# Patient Record
Sex: MALE | Race: WHITE | NOT HISPANIC OR LATINO
[De-identification: names, ages, dates, MRNs, and addresses within clinical notes are randomized per-mention and may not be internally consistent; named-entity substitution may affect disease eponyms.]

---

## 2017-08-03 ENCOUNTER — RECORDS - HEALTHEAST (OUTPATIENT)
Dept: ADMINISTRATIVE | Facility: OTHER | Age: 67
End: 2017-08-03

## 2018-02-08 ENCOUNTER — RECORDS - HEALTHEAST (OUTPATIENT)
Dept: ADMINISTRATIVE | Facility: OTHER | Age: 68
End: 2018-02-08

## 2018-02-14 ENCOUNTER — RECORDS - HEALTHEAST (OUTPATIENT)
Dept: ADMINISTRATIVE | Facility: OTHER | Age: 68
End: 2018-02-14

## 2018-02-21 ENCOUNTER — COMMUNICATION - HEALTHEAST (OUTPATIENT)
Dept: PULMONOLOGY | Facility: OTHER | Age: 68
End: 2018-02-21

## 2018-02-21 ENCOUNTER — AMBULATORY - HEALTHEAST (OUTPATIENT)
Dept: PULMONOLOGY | Facility: OTHER | Age: 68
End: 2018-02-21

## 2018-03-08 ENCOUNTER — OFFICE VISIT - HEALTHEAST (OUTPATIENT)
Dept: PULMONOLOGY | Facility: OTHER | Age: 68
End: 2018-03-08

## 2018-03-08 DIAGNOSIS — J84.116 CRYPTOGENIC ORGANIZING PNEUMONIA (H): ICD-10-CM

## 2018-03-09 ENCOUNTER — COMMUNICATION - HEALTHEAST (OUTPATIENT)
Dept: PULMONOLOGY | Facility: OTHER | Age: 68
End: 2018-03-09

## 2018-03-13 ENCOUNTER — COMMUNICATION - HEALTHEAST (OUTPATIENT)
Dept: PULMONOLOGY | Facility: OTHER | Age: 68
End: 2018-03-13

## 2018-03-13 DIAGNOSIS — J84.116 CRYPTOGENIC ORGANIZING PNEUMONIA (H): ICD-10-CM

## 2018-03-14 ENCOUNTER — AMBULATORY - HEALTHEAST (OUTPATIENT)
Dept: PULMONOLOGY | Facility: OTHER | Age: 68
End: 2018-03-14

## 2018-03-16 ENCOUNTER — AMBULATORY - HEALTHEAST (OUTPATIENT)
Dept: PULMONOLOGY | Facility: OTHER | Age: 68
End: 2018-03-16

## 2018-03-16 DIAGNOSIS — B38.2 PULMONARY COCCIDIOIDOMYCOSIS (H): ICD-10-CM

## 2018-03-19 ENCOUNTER — AMBULATORY - HEALTHEAST (OUTPATIENT)
Dept: PULMONOLOGY | Facility: OTHER | Age: 68
End: 2018-03-19

## 2018-03-19 DIAGNOSIS — B38.2 PULMONARY COCCIDIOIDOMYCOSIS (H): ICD-10-CM

## 2018-03-19 DIAGNOSIS — J84.116 CRYPTOGENIC ORGANIZING PNEUMONIA (H): ICD-10-CM

## 2018-03-22 ENCOUNTER — COMMUNICATION - HEALTHEAST (OUTPATIENT)
Dept: PULMONOLOGY | Facility: OTHER | Age: 68
End: 2018-03-22

## 2018-03-27 ENCOUNTER — AMBULATORY - HEALTHEAST (OUTPATIENT)
Dept: PULMONOLOGY | Facility: OTHER | Age: 68
End: 2018-03-27

## 2018-03-27 DIAGNOSIS — J18.9 PNEUMONIA: ICD-10-CM

## 2018-03-29 ENCOUNTER — HOSPITAL ENCOUNTER (OUTPATIENT)
Dept: CT IMAGING | Facility: CLINIC | Age: 68
Discharge: HOME | End: 2018-03-29
Attending: INTERNAL MEDICINE

## 2018-03-29 DIAGNOSIS — J84.116 CRYPTOGENIC ORGANIZING PNEUMONIA (H): ICD-10-CM

## 2018-04-09 ENCOUNTER — OFFICE VISIT - HEALTHEAST (OUTPATIENT)
Dept: PULMONOLOGY | Facility: OTHER | Age: 68
End: 2018-04-09

## 2018-04-09 DIAGNOSIS — B38.2 COCCIDIOIDAL PNEUMONITIS (H): ICD-10-CM

## 2018-04-09 LAB
ALBUMIN SERPL-MCNC: 3.7 G/DL (ref 3.5–5)
ALP SERPL-CCNC: 55 U/L (ref 45–120)
ALT SERPL W P-5'-P-CCNC: 34 U/L (ref 0–45)
ANION GAP SERPL CALCULATED.3IONS-SCNC: 10 MMOL/L (ref 5–18)
AST SERPL W P-5'-P-CCNC: 28 U/L (ref 0–40)
BILIRUB SERPL-MCNC: 0.4 MG/DL (ref 0–1)
BUN SERPL-MCNC: 19 MG/DL (ref 8–22)
CALCIUM SERPL-MCNC: 9.2 MG/DL (ref 8.5–10.5)
CHLORIDE BLD-SCNC: 106 MMOL/L (ref 98–107)
CO2 SERPL-SCNC: 24 MMOL/L (ref 22–31)
CREAT SERPL-MCNC: 0.81 MG/DL (ref 0.7–1.3)
GFR SERPL CREATININE-BSD FRML MDRD: >60 ML/MIN/1.73M2
GLUCOSE BLD-MCNC: 88 MG/DL (ref 70–125)
POTASSIUM BLD-SCNC: 4.1 MMOL/L (ref 3.5–5)
PROT SERPL-MCNC: 7.6 G/DL (ref 6–8)
SODIUM SERPL-SCNC: 140 MMOL/L (ref 136–145)

## 2018-04-10 ENCOUNTER — HOSPITAL ENCOUNTER (OUTPATIENT)
Dept: RESPIRATORY THERAPY | Facility: CLINIC | Age: 68
Discharge: HOME | End: 2018-04-10
Attending: INTERNAL MEDICINE

## 2018-04-10 DIAGNOSIS — B38.2 COCCIDIOIDAL PNEUMONITIS (H): ICD-10-CM

## 2018-04-10 LAB — HGB BLD-MCNC: 15.4 G/DL (ref 14–18)

## 2018-04-23 ENCOUNTER — COMMUNICATION - HEALTHEAST (OUTPATIENT)
Dept: PULMONOLOGY | Facility: OTHER | Age: 68
End: 2018-04-23

## 2018-04-23 DIAGNOSIS — J18.9 PNEUMONIA: ICD-10-CM

## 2018-05-24 ENCOUNTER — RECORDS - HEALTHEAST (OUTPATIENT)
Dept: ADMINISTRATIVE | Facility: OTHER | Age: 68
End: 2018-05-24

## 2018-05-24 ENCOUNTER — OFFICE VISIT - HEALTHEAST (OUTPATIENT)
Dept: PULMONOLOGY | Facility: OTHER | Age: 68
End: 2018-05-24

## 2018-05-24 DIAGNOSIS — R06.02 SHORTNESS OF BREATH: ICD-10-CM

## 2018-05-24 DIAGNOSIS — H93.13 TINNITUS OF BOTH EARS: ICD-10-CM

## 2018-05-24 DIAGNOSIS — J18.9 PNEUMONIA OF BOTH LUNGS DUE TO INFECTIOUS ORGANISM, UNSPECIFIED PART OF LUNG: ICD-10-CM

## 2018-05-24 DIAGNOSIS — B38.2 COCCIDIOIDAL PNEUMONITIS (H): ICD-10-CM

## 2018-05-25 LAB
ALBUMIN SERPL-MCNC: 3.9 G/DL (ref 3.5–5)
ALP SERPL-CCNC: 53 U/L (ref 45–120)
ALT SERPL W P-5'-P-CCNC: 32 U/L (ref 0–45)
ANION GAP SERPL CALCULATED.3IONS-SCNC: 10 MMOL/L (ref 5–18)
AST SERPL W P-5'-P-CCNC: 28 U/L (ref 0–40)
BILIRUB SERPL-MCNC: 0.5 MG/DL (ref 0–1)
BUN SERPL-MCNC: 19 MG/DL (ref 8–22)
CALCIUM SERPL-MCNC: 9.9 MG/DL (ref 8.5–10.5)
CHLORIDE BLD-SCNC: 107 MMOL/L (ref 98–107)
CO2 SERPL-SCNC: 24 MMOL/L (ref 22–31)
CREAT SERPL-MCNC: 0.89 MG/DL (ref 0.7–1.3)
GFR SERPL CREATININE-BSD FRML MDRD: >60 ML/MIN/1.73M2
GLUCOSE BLD-MCNC: 112 MG/DL (ref 70–125)
POTASSIUM BLD-SCNC: 4.3 MMOL/L (ref 3.5–5)
PROT SERPL-MCNC: 7.3 G/DL (ref 6–8)
SODIUM SERPL-SCNC: 141 MMOL/L (ref 136–145)

## 2018-06-01 LAB — COCCIDIOIDES AB TITR SER CF: NORMAL {TITER}

## 2018-07-06 ENCOUNTER — RECORDS - HEALTHEAST (OUTPATIENT)
Dept: LAB | Facility: CLINIC | Age: 68
End: 2018-07-06

## 2018-07-06 LAB
ALBUMIN SERPL-MCNC: 4 G/DL (ref 3.5–5)
ALP SERPL-CCNC: 54 U/L (ref 45–120)
ALT SERPL W P-5'-P-CCNC: 34 U/L (ref 0–45)
ANION GAP SERPL CALCULATED.3IONS-SCNC: 7 MMOL/L (ref 5–18)
AST SERPL W P-5'-P-CCNC: 25 U/L (ref 0–40)
BILIRUB SERPL-MCNC: 0.4 MG/DL (ref 0–1)
BUN SERPL-MCNC: 21 MG/DL (ref 8–22)
CALCIUM SERPL-MCNC: 9.8 MG/DL (ref 8.5–10.5)
CHLORIDE BLD-SCNC: 106 MMOL/L (ref 98–107)
CHOLEST SERPL-MCNC: 245 MG/DL
CO2 SERPL-SCNC: 28 MMOL/L (ref 22–31)
CREAT SERPL-MCNC: 0.88 MG/DL (ref 0.7–1.3)
FASTING STATUS PATIENT QL REPORTED: NO
GFR SERPL CREATININE-BSD FRML MDRD: >60 ML/MIN/1.73M2
GLUCOSE BLD-MCNC: 106 MG/DL (ref 70–125)
HDLC SERPL-MCNC: 36 MG/DL
LDLC SERPL CALC-MCNC: 152 MG/DL
LDLC SERPL CALC-MCNC: ABNORMAL MG/DL
POTASSIUM BLD-SCNC: 4.7 MMOL/L (ref 3.5–5)
PROT SERPL-MCNC: 7.9 G/DL (ref 6–8)
SODIUM SERPL-SCNC: 141 MMOL/L (ref 136–145)
TRIGL SERPL-MCNC: 587 MG/DL

## 2018-07-12 ENCOUNTER — OFFICE VISIT - HEALTHEAST (OUTPATIENT)
Dept: PULMONOLOGY | Facility: OTHER | Age: 68
End: 2018-07-12

## 2018-07-12 DIAGNOSIS — J16.8 FUNGAL PNEUMONIA: ICD-10-CM

## 2018-07-12 DIAGNOSIS — A07.3 COCCIDIOSIS: ICD-10-CM

## 2018-07-12 DIAGNOSIS — B49 FUNGAL PNEUMONIA: ICD-10-CM

## 2018-11-09 ENCOUNTER — OFFICE VISIT - HEALTHEAST (OUTPATIENT)
Dept: PULMONOLOGY | Facility: OTHER | Age: 68
End: 2018-11-09

## 2018-11-09 DIAGNOSIS — J31.0 CHRONIC RHINITIS: ICD-10-CM

## 2018-11-09 ASSESSMENT — MIFFLIN-ST. JEOR: SCORE: 1638.96

## 2018-12-19 ENCOUNTER — COMMUNICATION - HEALTHEAST (OUTPATIENT)
Dept: PULMONOLOGY | Facility: OTHER | Age: 68
End: 2018-12-19

## 2019-01-02 ENCOUNTER — RECORDS - HEALTHEAST (OUTPATIENT)
Dept: LAB | Facility: CLINIC | Age: 69
End: 2019-01-02

## 2019-01-02 LAB — D DIMER PPP FEU-MCNC: 1.15 FEU UG/ML

## 2019-01-03 ENCOUNTER — AMBULATORY - HEALTHEAST (OUTPATIENT)
Dept: PULMONOLOGY | Facility: OTHER | Age: 69
End: 2019-01-03

## 2019-01-03 ENCOUNTER — COMMUNICATION - HEALTHEAST (OUTPATIENT)
Dept: PULMONOLOGY | Facility: OTHER | Age: 69
End: 2019-01-03

## 2019-01-03 DIAGNOSIS — J18.9 PNEUMONIA OF RIGHT UPPER LOBE DUE TO INFECTIOUS ORGANISM: ICD-10-CM

## 2019-01-04 ENCOUNTER — AMBULATORY - HEALTHEAST (OUTPATIENT)
Dept: LAB | Facility: CLINIC | Age: 69
End: 2019-01-04

## 2019-01-04 DIAGNOSIS — J18.9 PNEUMONIA OF RIGHT UPPER LOBE DUE TO INFECTIOUS ORGANISM: ICD-10-CM

## 2019-01-04 LAB — C REACTIVE PROTEIN LHE: 3.4 MG/DL (ref 0–0.8)

## 2019-01-05 LAB
H CAPSUL AG UR QL IA: NOT DETECTED
H CAPSUL AG UR-MCNC: NOT DETECTED NG/ML

## 2019-01-06 LAB — COCCIDIOIDES AB TITR SER CF: NORMAL {TITER}

## 2019-01-08 LAB
H CAPSUL AB SER QL ID: NORMAL
H CAPSUL MYC AB TITR SER CF: NORMAL {TITER}
H CAPSUL YST AB TITR SER CF: NORMAL {TITER}

## 2019-01-09 ENCOUNTER — OFFICE VISIT - HEALTHEAST (OUTPATIENT)
Dept: PULMONOLOGY | Facility: OTHER | Age: 69
End: 2019-01-09

## 2019-01-09 DIAGNOSIS — J18.9 PNEUMONIA OF LEFT LUNG DUE TO INFECTIOUS ORGANISM, UNSPECIFIED PART OF LUNG: ICD-10-CM

## 2019-01-09 DIAGNOSIS — J43.2 CENTRILOBULAR EMPHYSEMA (H): ICD-10-CM

## 2019-01-09 DIAGNOSIS — J84.116 CRYPTOGENIC ORGANIZING PNEUMONIA (H): ICD-10-CM

## 2019-03-08 ENCOUNTER — HOSPITAL ENCOUNTER (OUTPATIENT)
Dept: CT IMAGING | Facility: CLINIC | Age: 69
Discharge: HOME | End: 2019-03-08
Attending: INTERNAL MEDICINE

## 2019-03-08 DIAGNOSIS — J18.9 PNEUMONIA OF LEFT LUNG DUE TO INFECTIOUS ORGANISM, UNSPECIFIED PART OF LUNG: ICD-10-CM

## 2019-03-19 ENCOUNTER — OFFICE VISIT - HEALTHEAST (OUTPATIENT)
Dept: PULMONOLOGY | Facility: OTHER | Age: 69
End: 2019-03-19

## 2019-03-19 DIAGNOSIS — B38.2: ICD-10-CM

## 2019-03-19 DIAGNOSIS — J47.9 BRONCHIECTASIS WITHOUT COMPLICATION (H): ICD-10-CM

## 2019-07-30 ENCOUNTER — RECORDS - HEALTHEAST (OUTPATIENT)
Dept: LAB | Facility: CLINIC | Age: 69
End: 2019-07-30

## 2019-07-30 LAB
ALBUMIN SERPL-MCNC: 4.1 G/DL (ref 3.5–5)
ALP SERPL-CCNC: 73 U/L (ref 45–120)
ALT SERPL W P-5'-P-CCNC: 31 U/L (ref 0–45)
ANION GAP SERPL CALCULATED.3IONS-SCNC: 10 MMOL/L (ref 5–18)
AST SERPL W P-5'-P-CCNC: 26 U/L (ref 0–40)
BILIRUB SERPL-MCNC: 0.7 MG/DL (ref 0–1)
BUN SERPL-MCNC: 20 MG/DL (ref 8–22)
CALCIUM SERPL-MCNC: 10.1 MG/DL (ref 8.5–10.5)
CHLORIDE BLD-SCNC: 103 MMOL/L (ref 98–107)
CHOLEST SERPL-MCNC: 218 MG/DL
CO2 SERPL-SCNC: 25 MMOL/L (ref 22–31)
CREAT SERPL-MCNC: 0.77 MG/DL (ref 0.7–1.3)
FASTING STATUS PATIENT QL REPORTED: YES
GFR SERPL CREATININE-BSD FRML MDRD: >60 ML/MIN/1.73M2
GLUCOSE BLD-MCNC: 104 MG/DL (ref 70–125)
HDLC SERPL-MCNC: 53 MG/DL
LDLC SERPL CALC-MCNC: 143 MG/DL
POTASSIUM BLD-SCNC: 4.5 MMOL/L (ref 3.5–5)
PROT SERPL-MCNC: 7.8 G/DL (ref 6–8)
SODIUM SERPL-SCNC: 138 MMOL/L (ref 136–145)
TRIGL SERPL-MCNC: 108 MG/DL

## 2020-07-13 ENCOUNTER — RECORDS - HEALTHEAST (OUTPATIENT)
Dept: LAB | Facility: CLINIC | Age: 70
End: 2020-07-13

## 2020-07-13 LAB
ALBUMIN SERPL-MCNC: 3.9 G/DL (ref 3.5–5)
ALP SERPL-CCNC: 52 U/L (ref 45–120)
ALT SERPL W P-5'-P-CCNC: 31 U/L (ref 0–45)
ANION GAP SERPL CALCULATED.3IONS-SCNC: 7 MMOL/L (ref 5–18)
AST SERPL W P-5'-P-CCNC: 24 U/L (ref 0–40)
BILIRUB SERPL-MCNC: 0.3 MG/DL (ref 0–1)
BUN SERPL-MCNC: 26 MG/DL (ref 8–28)
CALCIUM SERPL-MCNC: 9.1 MG/DL (ref 8.5–10.5)
CHLORIDE BLD-SCNC: 105 MMOL/L (ref 98–107)
CHOLEST SERPL-MCNC: 208 MG/DL
CO2 SERPL-SCNC: 27 MMOL/L (ref 22–31)
CREAT SERPL-MCNC: 0.79 MG/DL (ref 0.7–1.3)
FASTING STATUS PATIENT QL REPORTED: NO
GFR SERPL CREATININE-BSD FRML MDRD: >60 ML/MIN/1.73M2
GLUCOSE BLD-MCNC: 104 MG/DL (ref 70–125)
HDLC SERPL-MCNC: 43 MG/DL
LDLC SERPL CALC-MCNC: 130 MG/DL
POTASSIUM BLD-SCNC: 4.7 MMOL/L (ref 3.5–5)
PROT SERPL-MCNC: 7 G/DL (ref 6–8)
SODIUM SERPL-SCNC: 139 MMOL/L (ref 136–145)
TRIGL SERPL-MCNC: 174 MG/DL

## 2020-08-06 ENCOUNTER — COMMUNICATION - HEALTHEAST (OUTPATIENT)
Dept: SCHEDULING | Facility: CLINIC | Age: 70
End: 2020-08-06

## 2021-05-20 ENCOUNTER — TRANSFERRED RECORDS (OUTPATIENT)
Dept: HEALTH INFORMATION MANAGEMENT | Facility: CLINIC | Age: 71
End: 2021-05-20

## 2021-06-01 VITALS — BODY MASS INDEX: 26.78 KG/M2 | WEIGHT: 192 LBS

## 2021-06-01 VITALS — WEIGHT: 193 LBS | BODY MASS INDEX: 26.92 KG/M2

## 2021-06-01 VITALS — WEIGHT: 197.5 LBS | BODY MASS INDEX: 27.55 KG/M2

## 2021-06-01 VITALS — BODY MASS INDEX: 27.48 KG/M2 | WEIGHT: 197 LBS

## 2021-06-02 VITALS — HEIGHT: 71 IN | BODY MASS INDEX: 26.6 KG/M2 | WEIGHT: 190 LBS

## 2021-06-02 VITALS — WEIGHT: 196.2 LBS | BODY MASS INDEX: 27.36 KG/M2

## 2021-06-02 VITALS — BODY MASS INDEX: 27.84 KG/M2 | WEIGHT: 199.6 LBS

## 2021-06-16 PROBLEM — J18.9 ATYPICAL PNEUMONIA: Status: ACTIVE | Noted: 2018-02-19

## 2021-06-16 PROBLEM — B38.2: Status: ACTIVE | Noted: 2018-03-16

## 2021-06-16 PROBLEM — J18.9 PNEUMONIA: Status: ACTIVE | Noted: 2018-02-19

## 2021-06-16 NOTE — PROGRESS NOTES
Pulmonary Follow Up Note  3/8/2018      Reason for Follow Up: Abnormal CT, organizing pneumonia      Problem List:     Patient Active Problem List   Diagnosis     BPH (benign prostatic hyperplasia)     Urosepsis     Bacteremia     Diarrhea     UTI (lower urinary tract infection)     Hypertension     Peripheral neuropathy     Tachycardia     Sepsis     Fluid collection at surgical site     Fever, unspecified fever cause     Pneumonia     Atypical pneumonia     Acute respiratory failure with hypoxia     Weight loss     Chronic fever     Weakness generalized           History:   Tao Workman is a 67 y.o. old male with past medical history significant for calcified hilar lymph nodes, hypertension, obstructive sleep apnea on CPAP, and previous tobacco use who was seen in [facility]  to the hospital 2/20/18 with complaints of weakness and fevers ×1 month.  He reported that approximately 1 month prior he was having fevers up to 101 Fahrenheit.  He subsequently seen by his primary care provider who diagnosed him with bilateral pneumonia.  He was started on doxycycline.  He was planning to go to [place] at that time and so he did not have significant improvement within 3 days and so he was subsequently switched to Levaquin.  After 2 doses of Levaquin patient developed a rash with hives and was taken off of this.  He subsequen did not improve and was again placed on azithromycin without any improvement.  Throughout this fever did improve to upper 90s to low 100s but did not completely resolve.  He subsequently then was treated with a course of Augmentin.  He did not have any diarrhea or abdominal pain.  He tolerated the most the medications with the exception of the Levaquin.  Outside of the rash and hives he developed on the left when he denies any rash around his eyes chest or elsewhere.  He has not had no change in his vision.  He denies any chest pains or significant sputum production.  He does not notice any  significant timing of the day that she develops this fever.  He subsequently did have a CT angiogram that showed diffuse nodular changes throughout both lungs with the previously noted calcified lymph nodes.  He subsequently was referred for pulmonary consultation due to the CT results.  Of note his CRP was also significantly elevated at 8.1.  He did have a mild leukocytosis but his PMNs were less than 70%.  Does have a previous history of smoking but quit approximately 14 years ago.  No family history of autoimmune diseases.  He subsequently underwent bronchoscopy with TBBx and BAL.    He is seen in follow up today.  His pathology came back as organizing pneumonia.  I had discussed using prednisone with him, but he was hesitant to do due thte relative immune suppression and previous issues with staph skin infections.  He now also has a Cocci titer of 1:8.  He has not had any travel to endemic areas including the [facility].  He thinks the night sweats have mostly resolved.  He feels his breathing is doing good.  He does note some intermittent chills, as well as cough.  No rash, arthralgias, problems urinating, or ulcers.    Past Medical History:   Diagnosis Date     BPH with elevated PSA      GERD (gastroesophageal reflux disease)      Hypertension      Lymphadenopathy     seen on MRI     Peripheral neuropathy      Urinary frequency      Past Surgical History:   Procedure Laterality Date     MANDIBLE SURGERY      orthodontic procedure     [place] LAP,PROSTATECTOMY,RADICAL,W/NERVE SPARE,INCL ROBOTIC Bilateral 5/20/2015    Procedure: DAVINCI ROBOTIC ASSISTED SIMPLE PROSTATECTOMY WITH BILATERAL PELVIC LYMPH NODE DISSECTION;  Surgeon: Rocky Magaña MD;  Location: [facility];  Service: Urology     PROSTATE BIOPSY      multiple     Social History     Social History     Marital status: Single     Spouse name: N/A     Number of children: N/A     Years of education: N/A     Occupational History     Not on file.      Social History Main Topics     Smoking status: Former Smoker     Years: 35.00     Quit date: 5/1/2004     Smokeless tobacco: Never Used     Alcohol use 5.4 oz/week     9 Glasses of wine per week      Comment: weekly     Drug use: No     Sexual activity: Not on file     Other Topics Concern     Not on file     Social History Narrative     No family history on file.  Allergies   Allergen Reactions     Levaquin [Levofloxacin] Hives, Itching and Rash       Review of Systems - 10 point review of systems negative except what is mentioned in the HPI.        Medications:     Current Outpatient Prescriptions on File Prior to Visit   Medication Sig Dispense Refill     acetaminophen (TYLENOL) 500 MG tablet Take 500-1,000 mg by mouth every 6 (six) hours as needed for pain.       bismuth subsalicylate (BISMUTH SUBSALICYLATE) 262 mg Chew chew tab Chew 1-2 tablets 4 (four) times a day as needed.       doxylamine (UNISON) 25 mg tablet Take 12.5 mg by mouth at bedtime.       gabapentin (NEURONTIN) 300 MG capsule Take 600 mg by mouth 2 (two) times a day.        lisinopril (PRINIVIL,ZESTRIL) 5 MG tablet Take 5 mg by mouth daily.        multivitamin therapeutic (THERAGRAN) tablet Take 1 tablet by mouth daily.       simethicone (MYLICON) 80 MG chewable tablet Chew 80 mg every 6 (six) hours as needed.        traMADol (ULTRAM) 50 mg tablet Take 50 mg by mouth 2 (two) times a day as needed for pain.       UBIDECARENONE (COENZYME Q10) 100 mg Tab tablet Take 200 mg by mouth 2 (two) times a day.       [DISCONTINUED] guaiFENesin ER (MUCINEX) 600 mg 12 hr tablet Take 600 mg by mouth 2 (two) times a day as needed for congestion.       No current facility-administered medications on file prior to visit.            Exam/Data:   Vitals  Vitals:    03/08/18 1323   BP: 126/70   Pulse: 96   Resp: 16   SpO2: 94%       EXAM:  GEN: Alert and oriented x3, NAD  HEENT: Nares patent, posterior oropharynx clear.  RESPIRATORY: CTAB.  No wheeze or  rales  CARDIOVASCULAR: RRR, no m/r/g  GASTROINTESTINAL: Round, soft, NT/ND  HEM/ONC: no adenopathy, no ecchymosis  MSK: no clubbing.  Ambulates normally  NEURO: cranial nerves appear grossly intact, muscle strength equal  SKIN: no rash or ulcerations      DATA  No new data.    Organizing pneumonia on surgical pathology  Cocci titer 1:8 on complement fixation    IRINEO was negative, cytology negative.     Fungal cultures pending.    Assessment/Plan:   Tao Workman is a 67 y.o. male with abnormal CT and fevers, subsequently found to have organizing pneumonia on pathology, possibly cryptogenic vs fungal infection induced.    1. Organizing Pneumonia/ILD:  I have discussed with him using prednisone +/- itraconazole.  I am not sure the coccidiodes is real though as he has not been in endemic areas.  He is very hesitant to do any prednisone due to the side effects (i.e. Insomnia, cataracts, immune suppression).  He is improving clinically and ultimately would prefer to repeat the CT and see if the changes are resolved.  I think this is reasonable and will repeat the CT Chest in 3 weeks.  I did discuss with him that  can be recurrent as part of its natural progression.  He is okay with this.  I would consider azithromycin but he has some mild tinnitus as it is.  I would also consider cellcept or azathioprine but he will review these side effect profiles and we will see him back in 3 weeks.      Recommend:  - CT Chest  - consider further treatment at follow up  - RTC in 3 weeks    I spent more than 25 minutes in consultation reviewing natural course of disease, potential therapies, and future plans.      Yaakov Farley, DO

## 2021-06-16 NOTE — PROGRESS NOTES
Prior Authorization Request  Who s requesting:  Pharmacy  Pharmacy Name and Location: [facility] [address] [place] [address]  Medication Name: itraconazole 100 mg Cap  Insurance Plan: [employer]  Insurance Member ID Number:  [id number]

## 2021-06-16 NOTE — PROGRESS NOTES
Tao called to say his ringing in his ears is better but not gone completely.  Will start Fluconazole 400 mg a day per Dr. Houston, instructed to call with any questions or concerns.  Chest Ct on March 29 th. And will follow up with Dr. Farley after the Ct.

## 2021-06-16 NOTE — PROGRESS NOTES
Tao called.  Dr. Farley interested in results of his CT chest low dose for lung cancer screening that he had done last summer.    Tao states scan was done at [facility] in [facility]) and scan date was August 3, 2017.  Rosita will call for results.

## 2021-06-16 NOTE — PROGRESS NOTES
Spoke with Chacha from  insurance.  Working on appeal for itraconazole.  New diagnosis sent.  Chacha said she will submit the appeal and we should hear something back first part of next week.

## 2021-06-17 NOTE — PROGRESS NOTES
RESPIRATORY CARE NOTE     Patient Name: Tao Workman  Today's Date: 4/10/2018     Complete PFT done. Pt performed tests with good effort. Test results meet ATS criteria. Results scanned into epic. Pt left in no distress.       Caroline Jean RRT

## 2021-06-17 NOTE — PROGRESS NOTES
Pulmonary Follow Up Note  4/9/2018      Reason for Follow Up: Abnormal CT, organizing pneumonia, now with Coccidioides antibody positive at 1:8      Problem List:     Patient Active Problem List   Diagnosis     BPH (benign prostatic hyperplasia)     Urosepsis     Bacteremia     Diarrhea     UTI (lower urinary tract infection)     Hypertension     Peripheral neuropathy     Tachycardia     Sepsis     Fluid collection at surgical site     Fever, unspecified fever cause     Pneumonia     Atypical pneumonia     Acute respiratory failure with hypoxia     Weight loss     Chronic fever     Weakness generalized     Pulmonary coccidioidomycosis           History:   Tao Workman is a 67 y.o. old male with past medical history significant for calcified hilar lymph nodes, hypertension, obstructive sleep apnea on CPAP, and previous tobacco use who was seen in [facility]  to the hospital 2/20/18 with complaints of weakness and fevers ×1 month.  He reported that approximately 1 month prior he was having fevers up to 101 Fahrenheit.  He subsequently seen by his primary care provider who diagnosed him with bilateral pneumonia.  He was started on doxycycline.  He was planning to go to [place] at that time and so he did not have significant improvement within 3 days and so he was subsequently switched to Levaquin.  After 2 doses of Levaquin patient developed a rash with hives and was taken off of this.  He subsequen did not improve and was again placed on azithromycin without any improvement.  Throughout this fever did improve to upper 90s to low 100s but did not completely resolve.  He subsequently then was treated with a course of Augmentin.  He did not have any diarrhea or abdominal pain.  He tolerated the most the medications with the exception of the Levaquin.  Outside of the rash and hives he developed on the left when he denies any rash around his eyes chest or elsewhere.  He has not had no change in his vision.  He denies  any chest pains or significant sputum production.  He does not notice any significant timing of the day that she develops this fever.  He subsequently did have a CT angiogram that showed diffuse nodular changes throughout both lungs with the previously noted calcified lymph nodes.  He subsequently was referred for pulmonary consultation due to the CT results.  Of note his CRP was also significantly elevated at 8.1.  He did have a mild leukocytosis but his PMNs were less than 70%.  Does have a previous history of smoking but quit approximately 14 years ago.  No family history of autoimmune diseases.  He subsequently underwent bronchoscopy with TBBx and BAL.  This is subsequently show organizing pneumonia on his pathology and he does have a positive antibody to Coccidioides.    He is seen in follow up today.  As noted above he did have a positive cocci antibody and so he was started on itraconazole.  He subsequently developed tinnitus within 2 days and this medication was stopped.  We did subsequent switch him over to fluconazole 400 mg daily and he is tolerating this medication.  He states that his tinnitus is very mild the near its baseline.  He still feels fatigued and he has dyspnea with extreme exertion or when he is staying up late.  For the most part though his dyspnea has resolved as well.  He has not had any fevers or chills.  He denies any night sweats.  He has had some diarrhea as well but he states that this is present prior to starting any fluconazole.  Overall he puts his timeframe of therapy at approximately 1 week as he did 2 days of itraconazole and has done approximately 1 week of fluconazole.  He did undergo repeat CT scan which shows significant improvement in his opacities.  He does have some opacity still noted mostly in areas where he had more consolidation.    Of note patient does identify that he has a cactus in his home.  He has had this This though for approximately 8 years.  At one point it  was over watered and subsequently reimplanted one limb of the cactus.  He has thus replanted the cactus and changed its oil.  The previously would disrupt the soil to assess and its dryness prior to watering.  He did buy this cactus at a local nursery is unsure where this soil came from.  The repot things will was commercially available cactus soil.    Past Medical History:   Diagnosis Date     BPH with elevated PSA      GERD (gastroesophageal reflux disease)      Hypertension      Lymphadenopathy     seen on MRI     Peripheral neuropathy      Urinary frequency      Past Surgical History:   Procedure Laterality Date     MANDIBLE SURGERY      orthodontic procedure     [place] LAP,PROSTATECTOMY,RADICAL,W/NERVE SPARE,INCL ROBOTIC Bilateral 5/20/2015    Procedure: DAVINCI ROBOTIC ASSISTED SIMPLE PROSTATECTOMY WITH BILATERAL PELVIC LYMPH NODE DISSECTION;  Surgeon: Rocky Magaña MD;  Location: [facility];  Service: Urology     PROSTATE BIOPSY      multiple     Social History     Social History     Marital status: Single     Spouse name: N/A     Number of children: N/A     Years of education: N/A     Occupational History     Not on file.     Social History Main Topics     Smoking status: Former Smoker     Years: 35.00     Quit date: 5/1/2004     Smokeless tobacco: Never Used     Alcohol use 5.4 oz/week     9 Glasses of wine per week      Comment: weekly     Drug use: No     Sexual activity: Not on file     Other Topics Concern     Not on file     Social History Narrative     No family history on file.  Allergies   Allergen Reactions     Levaquin [Levofloxacin] Hives, Itching and Rash       Review of Systems - 10 point review of systems negative except what is mentioned in the HPI.        Medications:     Current Outpatient Prescriptions on File Prior to Visit   Medication Sig Dispense Refill     acetaminophen (TYLENOL) 500 MG tablet Take 500-1,000 mg by mouth every 6 (six) hours as needed for pain.       ASPIRIN  (ASPIR-81 ORAL) Take by mouth.       bismuth subsalicylate (BISMUTH SUBSALICYLATE) 262 mg Chew chew tab Chew 1-2 tablets 4 (four) times a day as needed.       DOCOSAHEXANOIC ACID/EPA (FISH OIL ORAL) Take 1,200 mg by mouth.       doxylamine (UNISON) 25 mg tablet Take 12.5 mg by mouth at bedtime.       fluconazole (DIFLUCAN) 200 MG tablet Take 2 tablets (400 mg total) by mouth daily. 60 tablet 0     fluticasone (FLONASE) 50 mcg/actuation nasal spray 1 spray into each nostril daily.       gabapentin (NEURONTIN) 300 MG capsule Take 600 mg by mouth 3 (three) times a day.        lisinopril (PRINIVIL,ZESTRIL) 5 MG tablet Take 5 mg by mouth daily.        multivitamin therapeutic (THERAGRAN) tablet Take 1 tablet by mouth 2 (two) times a day.        simethicone (MYLICON) 80 MG chewable tablet Chew 80 mg every 6 (six) hours as needed.        traMADol (ULTRAM) 50 mg tablet Take 50 mg by mouth 2 (two) times a day as needed for pain.       UBIDECARENONE (COENZYME Q10) 100 mg Tab tablet Take 200 mg by mouth 2 (two) times a day.       [DISCONTINUED] itraconazole (SPORANOX) 100 mg capsule Take 2 capsules (200 mg total) by mouth 2 (two) times a day for 21 days. 84 capsule 1     [DISCONTINUED] UNABLE TO FIND [facility]       No current facility-administered medications on file prior to visit.            Exam/Data:   Vitals  Vitals:    04/09/18 1327   BP: 142/82   Pulse: 94   Resp: 18   SpO2: 91%       EXAM:  GEN: Alert and oriented x3, NAD  HEENT: Nares patent, posterior oropharynx clear.  RESPIRATORY: CTAB.  No wheeze or rales  CARDIOVASCULAR: RRR, no m/r/g  GASTROINTESTINAL: Round, soft, NT/ND  HEM/ONC: no adenopathy, no ecchymosis  MSK: no clubbing.  Ambulates normally  NEURO: cranial nerves appear grossly intact, muscle strength equal  SKIN: no rash or ulcerations      DATA  Personally reviewed the patient's CT scan with the patient.  There are some mild areas of faint opacities mostly where there were previous  consolidations.  Overall the CT scan is markedly improved.    CT CHEST WO CONTRAST  3/29/2018 10:38 AM     INDICATION: Organized pneumonia  TECHNIQUE: Routine chest. Dose reduction techniques were used.  IV CONTRAST: None.  COMPARISON: CTA chest 02/19/2018.     FINDINGS:  LUNGS AND PLEURA: Marked improvement in the previously extensive bilateral reticular, nodular, and tree-in-bud interstitial and groundglass alveolar infiltrates. Relatively mild residual streaky interstitial and patchy groundglass alveolar opacities of   all pulmonary lobes. Mild bibasilar atelectasis. No change in small chronic calcified calcified granulomata of the right upper lobe near the oblique fissure. Relatively lucent appearance of both lungs suggests underlying emphysema.     MEDIASTINUM: Small chronic calcified mediastinal and right hilar lymph nodes related to chronic granulomatous disease. Normal heart and pericardium.     LIMITED UPPER ABDOMEN: Multiple tiny calcified granulomata in the liver and spleen.     MUSCULOSKELETAL: Degenerative changes of the thoracic spine.     IMPRESSION:   CONCLUSION:  Marked improvement in previously extensive bilateral interstitial and alveolar infiltrates.    Organizing pneumonia on surgical pathology  Cocci titer 1:8 on complement fixation    IRINEO was negative, cytology negative.     Fungal cultures pending.    Assessment/Plan:   Tao Workman is a 67 y.o. male with abnormal CT and fevers, subsequently found to have organizing pneumonia on pathology, possibly cryptogenic vs fungal infection induced.    1. 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  He was started on itraconazole but had side effects and was switched over to fluconazole.  He is now tolerating the fluconazole well and a CT of the chest is markedly improved on minimal therapy.  I am not sure the coccidiodes is real though as he has not been in endemic areas.   He does report having a cath this in his home of this was bought locally and it sounds as though the soil was essentially commercial so will likely not from an area of the country that would have Coccidioides present.  His symptoms are improving but he was noted to have some mild weight loss, persistent fevers for approximately 1 month, and extreme fatigue.  His antibody titer was elevated but not to a significant level.  I do feel that his overall symptomatology would warrant therapy so I am okay with ongoing treatment.  As far as his organizing pneumonia has markedly improvement is been very hesitant to use steroids.  Do not think that we need to treat this at this point.  Did discuss with him now with organizing pneumonia could come back if it is not truly due to a Coccidioides infection.    Recommend:  -Continue with fluconazole  -Full PFTs to evaluate for obstruction secondary to his organizing pneumonia  -CMP today  - RTC in 4 weeks    I spent more than 25 minutes in consultation reviewing natural course of disease, potential therapies, and future plans.      Yaakov Farley, DO

## 2021-06-18 NOTE — PROGRESS NOTES
Patient instructed in use of Anoro ellipta inhaler.  Patient states good understanding of how to use the ellipta device.  Printed instructions and phone numbers sent home with patient.

## 2021-06-18 NOTE — PROGRESS NOTES
Pulmonary Follow Up Note  5/24/2018      Reason for Follow Up: Abnormal CT, organizing pneumonia, now with Coccidioides antibody positive at 1:8 and concern for coccidioides fungal pneumonia.      Problem List:     Patient Active Problem List   Diagnosis     BPH (benign prostatic hyperplasia)     Urosepsis     Bacteremia     Diarrhea     UTI (lower urinary tract infection)     Hypertension     Peripheral neuropathy     Tachycardia     Sepsis     Fluid collection at surgical site     Fever, unspecified fever cause     Pneumonia     Atypical pneumonia     Acute respiratory failure with hypoxia     Weight loss     Chronic fever     Weakness generalized     Pulmonary coccidioidomycosis           History:   Tao Workman is a 67 y.o. old male with past medical history significant for calcified hilar lymph nodes, hypertension, obstructive sleep apnea on CPAP, and previous tobacco use who was seen in [facility]  to the hospital 2/20/18 with complaints of weakness and fevers ×1 month.  He reported that approximately 1 month prior he was having fevers up to 101 Fahrenheit.  He subsequently seen by his primary care provider who diagnosed him with bilateral pneumonia.  He was started on doxycycline.  He was planning to go to [place] at that time and so he did not have significant improvement within 3 days and so he was subsequently switched to Levaquin.  After 2 doses of Levaquin patient developed a rash with hives and was taken off of this.  He subsequen did not improve and was again placed on azithromycin without any improvement.  Throughout this fever did improve to upper 90s to low 100s but did not completely resolve.  He subsequently then was treated with a course of Augmentin.  He did not have any diarrhea or abdominal pain.  He tolerated the most the medications with the exception of the Levaquin.  Outside of the rash and hives he developed on the left when he denies any rash around his eyes chest or elsewhere.  He  has not had no change in his vision.  He denies any chest pains or significant sputum production.  He does not notice any significant timing of the day that she develops this fever.  He subsequently did have a CT angiogram that showed diffuse nodular changes throughout both lungs with the previously noted calcified lymph nodes.  He subsequently was referred for pulmonary consultation due to the CT results.  Of note his CRP was also significantly elevated at 8.1.  He did have a mild leukocytosis but his PMNs were less than 70%.  Does have a previous history of smoking but quit approximately 14 years ago.  No family history of autoimmune diseases.  He subsequently underwent bronchoscopy with TBBx and BAL.  This is subsequently show organizing pneumonia on his pathology and he does have a positive antibody to Coccidioides.    As noted above he did have a positive cocci antibody and so he was started on itraconazole.  He subsequently developed tinnitus within 2 days and this medication was stopped.  We did subsequent switch him over to fluconazole 400 mg daily and he is tolerating this medication.  He states that his tinnitus is very mild and near its baseline.  He still feels fatigued but thinks this is mostly due to holding his CPAP therapy while he is being treated for fungal infection.  He has dyspnea with extreme exertion or when he is staying up late, but this is mostly resolved now.  He has not had any fevers or chills.  He denies any night sweats.  He did undergo repeat CT scan which shows significant improvement in his opacities.  He does have some opacity still noted mostly in areas where he had more consolidation.    Today he continues to do fairly well.  He does still have some tinnitus. We did PFTs and he does have normal PFTs, though his FEV1/FVC is 70%.  He notes that when he did his PFTs, he felt really good for a couple days, but then has not noticed as much improvement with albuterol inhaler.  He is  wondering if he should be tested for Alpha-1 antitrypsin deficiency.  He also wonders how long he should remain on fluconazole.  He still has some tinnitus, but again this is low level, but still above his baseline.  He also notes that his vision is getting blurry faster than usual.    Of note patient does identify that he has a cactus in his home.  He has had this This though for approximately 8 years.  At one point it was over watered and subsequently reimplanted one limb of the cactus.  He has thus replanted the cactus and changed its oil.  The previously would disrupt the soil to assess and its dryness prior to watering.  He did buy this cactus at a local nursery is unsure where this soil came from.  The repot things will was commercially available cactus soil.    Past Medical History:   Diagnosis Date     BPH with elevated PSA      GERD (gastroesophageal reflux disease)      Hypertension      Lymphadenopathy     seen on MRI     Peripheral neuropathy      Urinary frequency      Past Surgical History:   Procedure Laterality Date     MANDIBLE SURGERY      orthodontic procedure     ND LAP,PROSTATECTOMY,RADICAL,W/NERVE SPARE,INCL ROBOTIC Bilateral 5/20/2015    Procedure: DAVINCI ROBOTIC ASSISTED SIMPLE PROSTATECTOMY WITH BILATERAL PELVIC LYMPH NODE DISSECTION;  Surgeon: Rocky Magaña MD;  Location: [facility] [place];  Service: Urology     PROSTATE BIOPSY      multiple     Social History     Social History     Marital status: Single     Spouse name: N/A     Number of children: N/A     Years of education: N/A     Occupational History     Not on file.     Social History Main Topics     Smoking status: Former Smoker     Packs/day: 1.00     Years: 35.00     Quit date: 5/1/2004     Smokeless tobacco: Never Used     Alcohol use 5.4 oz/week     9 Glasses of wine per week      Comment: weekly     Drug use: No     Sexual activity: Not on file     Other Topics Concern     Not on file     Social History Narrative     No  family history on file.  Allergies   Allergen Reactions     Levaquin [Levofloxacin] Hives, Itching and Rash       Review of Systems - 10 point review of systems negative except what is mentioned in the HPI.        Medications:     Current Outpatient Prescriptions on File Prior to Visit   Medication Sig Dispense Refill     acetaminophen (TYLENOL) 500 MG tablet Take 500-1,000 mg by mouth every 6 (six) hours as needed for pain.       ASPIRIN (ASPIR-81 ORAL) Take by mouth.       bismuth subsalicylate (BISMUTH SUBSALICYLATE) 262 mg Chew chew tab Chew 1-2 tablets 4 (four) times a day as needed.       DOCOSAHEXANOIC ACID/EPA (FISH OIL ORAL) Take 1,200 mg by mouth.       doxylamine (UNISON) 25 mg tablet Take 12.5 mg by mouth at bedtime.       fluconazole (DIFLUCAN) 200 MG tablet Take 2 tablets (400 mg total) by mouth daily. 84 tablet 0     gabapentin (NEURONTIN) 300 MG capsule Take 300 mg by mouth 3 (three) times a day.        lisinopril (PRINIVIL,ZESTRIL) 5 MG tablet Take 5 mg by mouth daily.        multivitamin therapeutic (THERAGRAN) tablet Take 1 tablet by mouth 2 (two) times a day.        simethicone (MYLICON) 80 MG chewable tablet Chew 80 mg every 6 (six) hours as needed.        traMADol (ULTRAM) 50 mg tablet Take 50 mg by mouth 2 (two) times a day as needed for pain.       UBIDECARENONE (COENZYME Q10) 100 mg Tab tablet Take 200 mg by mouth 2 (two) times a day.       [DISCONTINUED] albuterol (PROAIR HFA;PROVENTIL HFA;VENTOLIN HFA) 90 mcg/actuation inhaler Inhale 2 puffs every 6 (six) hours as needed for wheezing. 1 Inhaler 11     [DISCONTINUED] fluticasone (FLONASE) 50 mcg/actuation nasal spray 1 spray into each nostril daily.       No current facility-administered medications on file prior to visit.            Exam/Data:   Vitals  Vitals:    05/24/18 1101   BP: 134/86   Pulse: 76   Resp: 24   SpO2: 94%       EXAM:  GEN: Alert and oriented x3, NAD  HEENT: Nares patent, posterior oropharynx clear.  RESPIRATORY: CTAB.   No wheeze or rales  CARDIOVASCULAR: RRR, no m/r/g  GASTROINTESTINAL: Round, soft, NT/ND  HEM/ONC: no adenopathy, no ecchymosis  MSK: no clubbing.  Ambulates normally  NEURO: cranial nerves appear grossly intact, muscle strength equal  SKIN: no rash or ulcerations      DATA  Personally reviewed the patient's CT scan with the patient.  There are some mild areas of faint opacities mostly where there were previous consolidations.  Overall the CT scan is markedly improved.    CT CHEST WO CONTRAST  3/29/2018 10:38 AM     INDICATION: Organized pneumonia  TECHNIQUE: Routine chest. Dose reduction techniques were used.  IV CONTRAST: None.  COMPARISON: CTA chest 02/19/2018.     FINDINGS:  LUNGS AND PLEURA: Marked improvement in the previously extensive bilateral reticular, nodular, and tree-in-bud interstitial and groundglass alveolar infiltrates. Relatively mild residual streaky interstitial and patchy groundglass alveolar opacities of   all pulmonary lobes. Mild bibasilar atelectasis. No change in small chronic calcified calcified granulomata of the right upper lobe near the oblique fissure. Relatively lucent appearance of both lungs suggests underlying emphysema.     MEDIASTINUM: Small chronic calcified mediastinal and right hilar lymph nodes related to chronic granulomatous disease. Normal heart and pericardium.     LIMITED UPPER ABDOMEN: Multiple tiny calcified granulomata in the liver and spleen.     MUSCULOSKELETAL: Degenerative changes of the thoracic spine.     IMPRESSION:   CONCLUSION:  Marked improvement in previously extensive bilateral interstitial and alveolar infiltrates.    Organizing pneumonia on surgical pathology  Cocci titer 1:8 on complement fixation    IRINEO was negative, cytology negative.     Fungal cultures pending.    PFTs 5/2018  FEV1/FVC is 70 and is normal.  FEV1 is 98% predicted and is normal.  FVC is 106% predicted and normal.  There was no improvement in spirometry after a single inhaled dose of  bronchodilator.  Normal flow volume loop  TLC is 115% predicted and is normal.  RV is 124% predicted and is increased.  DLCO is 85% predicted and is normal when it   is corrected for hemoglobin.     Impression:  Full Pulmonary Function Test is normal.    Assessment/Plan:   Tao Workman is a 67 y.o. male with abnormal CT and fevers, subsequently found to have organizing pneumonia on pathology, possibly cryptogenic vs fungal infection induced.    1. 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  He was started on itraconazole but had side effects and was switched over to fluconazole.  He is now tolerating the fluconazole well and a CT of the chest is markedly improved on minimal therapy.  I am not sure the coccidiodes is real though as he has not been in endemic areas.  He does report having a cactus in his home and this was bought locally and it sounds as though the soil was essentially commercial so will likely not from an area of the country that would have Coccidioides present.  His symptoms are improving but he was noted to have some mild weight loss, persistent fevers for approximately 1 month, and extreme fatigue.  His antibody titer was elevated but not to a significant level.  I do feel that his overall symptomatology would warrant therapy so I am okay with ongoing treatment.  As far as his organizing pneumonia he has had marked improvement in his symptoms and he has been very hesitant to use steroids.  I do not think that we need to treat this at this point.  I did discuss with him how with organizing pneumonia could come back if it is not truly due to a Coccidioides infection.    I will check a CRP and CMP today.  I will also check a hearing test.  I want him to stay on the fluconazole for now.  I also encouraged him to see an ophthalmologist.    2. Shortness of breath possibly related to mild COPD:  He does report a response to  bronchodilators with albuterol.  I will try him on Anoro.  He does wish to be test for Alpha-1 antitrypsin deficiency.      3. SKYLER:  He can resume his CPAP at any time.    Recommend:  -Continue with fluconazole  -Anoro for COPD/asthma, will consider ICS therapy in the future, not likely from   -CMP today  - audiogram  - RTC in 6 weeks    I spent more than 40 minutes in consultation reviewing natural course of disease, potential therapies, and future plans.      Yaakov Farley, DO

## 2021-06-19 NOTE — PROGRESS NOTES
Pulmonary Follow Up Note  7/12/2018      Reason for Follow Up: Abnormal CT, organizing pneumonia, now with Coccidioides antibody positive at 1:8 and concern for coccidioides fungal pneumonia.      Problem List:     Patient Active Problem List   Diagnosis     BPH (benign prostatic hyperplasia)     Urosepsis     Bacteremia     Diarrhea     UTI (lower urinary tract infection)     Hypertension     Peripheral neuropathy (H)     Tachycardia     Sepsis (H)     Fluid collection at surgical site     Fever, unspecified fever cause     Pneumonia     Atypical pneumonia     Acute respiratory failure with hypoxia (H)     Weight loss     Chronic fever     Weakness generalized     Pulmonary coccidioidomycosis (H)           History:   Tao Workman is a 67 y.o. old male with past medical history significant for calcified hilar lymph nodes, hypertension, obstructive sleep apnea on CPAP, and previous tobacco use who was seen in [facility]  to the hospital 2/20/18 with complaints of weakness and fevers ×1 month.  He reported that approximately 1 month prior he was having fevers up to 101 Fahrenheit.  He subsequently seen by his primary care provider who diagnosed him with bilateral pneumonia.  He was started on doxycycline.  He was planning to go to [place] at that time and so he did not have significant improvement within 3 days and so he was subsequently switched to Levaquin.  After 2 doses of Levaquin patient developed a rash with hives and was taken off of this.  He subsequen did not improve and was again placed on azithromycin without any improvement.  Throughout this fever did improve to upper 90s to low 100s but did not completely resolve.  He subsequently then was treated with a course of Augmentin.  He did not have any diarrhea or abdominal pain.  He tolerated the most the medications with the exception of the Levaquin.  Outside of the rash and hives he developed on the left when he denies any rash around his eyes chest or  elsewhere.  He has not had no change in his vision.  He denies any chest pains or significant sputum production.  He does not notice any significant timing of the day that she develops this fever.  He subsequently did have a CT angiogram that showed diffuse nodular changes throughout both lungs with the previously noted calcified lymph nodes.  He subsequently was referred for pulmonary consultation due to the CT results.  Of note his CRP was also significantly elevated at 8.1.  He did have a mild leukocytosis but his PMNs were less than 70%.  Does have a previous history of smoking but quit approximately 14 years ago.  No family history of autoimmune diseases.  He subsequently underwent bronchoscopy with TBBx and BAL.  This is subsequently show organizing pneumonia on his pathology and he does have a positive antibody to Coccidioides.    As noted above he did have a positive cocci antibody and so he was started on itraconazole.  He subsequently developed tinnitus within 2 days and this medication was stopped.  We did subsequent switch him over to fluconazole 400 mg daily and he is tolerating this medication.  He states that his tinnitus is very mild and near its baseline.  He still feels fatigued but thinks this is mostly due to holding his CPAP therapy while he is being treated for fungal infection.  He has dyspnea with extreme exertion or when he is staying up late, but this is mostly resolved now.  He has not had any fevers or chills.  He denies any night sweats.  He did undergo repeat CT scan which shows significant improvement in his opacities.  He does have some opacity still noted mostly in areas where he had more consolidation.    Today he continues to do well and feels all of his respiratory B symptoms have resolved.  He does still have some tinnitus as well as some diplopia.  He was seen by an ophthalmologist who thinks the Diflucan is giving him weak muscles and he is fairly confident that this will  resolve once he completes Diflucan.  He does have 6 more days of therapy to complete 12 weeks. We did PFTs and he does have normal PFTs, though his FEV1/FVC is 70%.  He notes that when he did his PFTs, he felt really good for a couple days, but then has not noticed as much improvement with albuterol inhaler.  No further fevers, fatigue, night sweats, or shortness of breath.    Of note patient does identify that he has a cactus in his home.  He has had this This though for approximately 8 years.  At one point it was over watered and subsequently reimplanted one limb of the cactus.  He has thus replanted the cactus and changed its oil.  The previously would disrupt the soil to assess and its dryness prior to watering.  He did buy this cactus at a local nursery is unsure where this soil came from.  The repot things will was commercially available cactus soil.    Past Medical History:   Diagnosis Date     BPH with elevated PSA      GERD (gastroesophageal reflux disease)      Hypertension      Lymphadenopathy     seen on MRI     Peripheral neuropathy (H)      Urinary frequency      Past Surgical History:   Procedure Laterality Date     MANDIBLE SURGERY      orthodontic procedure     [place] LAP,PROSTATECTOMY,RADICAL,W/NERVE SPARE,INCL ROBOTIC Bilateral 5/20/2015    Procedure: DAVINCI ROBOTIC ASSISTED SIMPLE PROSTATECTOMY WITH BILATERAL PELVIC LYMPH NODE DISSECTION;  Surgeon: Rocky Magaña MD;  Location: [facility];  Service: Urology     PROSTATE BIOPSY      multiple     Social History     Social History     Marital status: Single     Spouse name: N/A     Number of children: N/A     Years of education: N/A     Occupational History     Not on file.     Social History Main Topics     Smoking status: Former Smoker     Packs/day: 1.00     Years: 35.00     Quit date: 5/1/2004     Smokeless tobacco: Never Used     Alcohol use 5.4 oz/week     9 Glasses of wine per week      Comment: weekly     Drug use: No     Sexual  activity: Not on file     Other Topics Concern     Not on file     Social History Narrative     No family history on file.  Allergies   Allergen Reactions     Levaquin [Levofloxacin] Hives, Itching and Rash       Review of Systems - 10 point review of systems negative except what is mentioned in the HPI.        Medications:     Current Outpatient Prescriptions on File Prior to Visit   Medication Sig Dispense Refill     acetaminophen (TYLENOL) 500 MG tablet Take 500-1,000 mg by mouth every 6 (six) hours as needed for pain.       ASPIRIN (ASPIR-81 ORAL) Take by mouth.       bismuth subsalicylate (BISMUTH SUBSALICYLATE) 262 mg Chew chew tab Chew 1-2 tablets 4 (four) times a day as needed.       DOCOSAHEXANOIC ACID/EPA (FISH OIL ORAL) Take 1,200 mg by mouth.       doxylamine (UNISON) 25 mg tablet Take 12.5 mg by mouth at bedtime.       gabapentin (NEURONTIN) 300 MG capsule Take 300 mg by mouth 3 (three) times a day.        lisinopril (PRINIVIL,ZESTRIL) 5 MG tablet Take 5 mg by mouth daily.        multivitamin therapeutic (THERAGRAN) tablet Take 1 tablet by mouth 2 (two) times a day.        simethicone (MYLICON) 80 MG chewable tablet Chew 80 mg every 6 (six) hours as needed.        traMADol (ULTRAM) 50 mg tablet Take 50 mg by mouth 2 (two) times a day as needed for pain.       UBIDECARENONE (COENZYME Q10) 100 mg Tab tablet Take 200 mg by mouth 2 (two) times a day.       [DISCONTINUED] umeclidinium-vilanterol (ANORO ELLIPTA) 62.5-25 mcg/actuation inhaler Inhale 1 puff daily. 1 each 5     No current facility-administered medications on file prior to visit.            Exam/Data:   Vitals  Vitals:    07/12/18 1636   BP: 132/80   Pulse:    Resp:    SpO2:        EXAM:  GEN: Alert and oriented x3, NAD  HEENT: Nares patent, posterior oropharynx clear.  RESPIRATORY: CTAB.  No wheeze or rales  CARDIOVASCULAR: RRR, no m/r/g  GASTROINTESTINAL: Round, soft, NT/ND  HEM/ONC: no adenopathy, no ecchymosis  MSK: no clubbing.  Ambulates  normally  NEURO: cranial nerves appear grossly intact, muscle strength equal  SKIN: no rash or ulcerations      DATA  Personally reviewed the patient's CT scan with the patient.  There are some mild areas of faint opacities mostly where there were previous consolidations.  Overall the CT scan is markedly improved.    CT CHEST WO CONTRAST  3/29/2018 10:38 AM     INDICATION: Organized pneumonia  TECHNIQUE: Routine chest. Dose reduction techniques were used.  IV CONTRAST: None.  COMPARISON: CTA chest 02/19/2018.     FINDINGS:  LUNGS AND PLEURA: Marked improvement in the previously extensive bilateral reticular, nodular, and tree-in-bud interstitial and groundglass alveolar infiltrates. Relatively mild residual streaky interstitial and patchy groundglass alveolar opacities of   all pulmonary lobes. Mild bibasilar atelectasis. No change in small chronic calcified calcified granulomata of the right upper lobe near the oblique fissure. Relatively lucent appearance of both lungs suggests underlying emphysema.     MEDIASTINUM: Small chronic calcified mediastinal and right hilar lymph nodes related to chronic granulomatous disease. Normal heart and pericardium.     LIMITED UPPER ABDOMEN: Multiple tiny calcified granulomata in the liver and spleen.     MUSCULOSKELETAL: Degenerative changes of the thoracic spine.     IMPRESSION:   CONCLUSION:  Marked improvement in previously extensive bilateral interstitial and alveolar infiltrates.    Organizing pneumonia on surgical pathology  Cocci titer 1:8 on complement fixation    IRINEO was negative, cytology negative.     Fungal cultures pending.    PFTs 5/2018  FEV1/FVC is 70 and is normal.  FEV1 is 98% predicted and is normal.  FVC is 106% predicted and normal.  There was no improvement in spirometry after a single inhaled dose of bronchodilator.  Normal flow volume loop  TLC is 115% predicted and is normal.  RV is 124% predicted and is increased.  DLCO is 85% predicted and is normal  when it   is corrected for hemoglobin.     Impression:  Full Pulmonary Function Test is normal.    Assessment/Plan:   Tao Workman is a 67 y.o. male with abnormal CT and fevers, subsequently found to have organizing pneumonia on pathology, possibly cryptogenic vs fungal infection induced.    1. 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  He was started on itraconazole but had side effects and was switched over to fluconazole.  He is now tolerating the fluconazole well and a CT of the chest is markedly improved on minimal therapy.  I am not sure the coccidiodes is real though as he has not been in endemic areas.  He does report having a cactus in his home and this was bought locally and it sounds as though the soil was essentially commercial so will likely not from an area of the country that would have Coccidioides present.  His symptoms are improving but he was noted to have some mild weight loss, persistent fevers for approximately 1 month, and extreme fatigue.  His antibody titer was elevated but not to a significant level.  I do feel that his overall symptomatology would warrant therapy so I am okay with ongoing treatment.  As far as his organizing pneumonia he has had marked improvement in his symptoms and he has been very hesitant to use steroids.  I do not think that we need to treat this at this point.  I did discuss with him how with organizing pneumonia could come back if it is not truly due to a Coccidioides infection.    Is follow-up cocci antibody is negative.  He will complete 12 weeks of fluconazole at the end of this week.  This will complete all of his therapy.    2. Shortness of breath possibly related to mild COPD:  He does report a response to bronchodilators with albuterol.  I will try him on Anoro.  He does wish to be test for Alpha-1 antitrypsin deficiency.      3. SKYLER: He has resumed his  CPAP    Recommend:  -Continue with fluconazole for 6 more days  -Stopped all inhaler  -Return to clinic in 3 months    I spent more than 15 minutes in consultation reviewing natural course of disease, potential therapies, and future plans.      Yaakov Farley, DO

## 2021-06-21 NOTE — PROGRESS NOTES
Pulmonary Follow Up Note  11/9/2018      Reason for Follow Up: Abnormal CT, organizing pneumonia, now with Coccidioides antibody positive at 1:8 and concern for coccidioides fungal pneumonia.      Problem List:     Patient Active Problem List   Diagnosis     BPH (benign prostatic hyperplasia)     Urosepsis     Bacteremia     Diarrhea     UTI (lower urinary tract infection)     Hypertension     Peripheral neuropathy     Tachycardia     Sepsis (H)     Fluid collection at surgical site     Fever, unspecified fever cause     Pneumonia     Atypical pneumonia     Acute respiratory failure with hypoxia (H)     Weight loss     Chronic fever     Weakness generalized     Pulmonary coccidioidomycosis (H)           History:   Tao Workman is a 67 y.o. old male with past medical history significant for calcified hilar lymph nodes, hypertension, obstructive sleep apnea on CPAP, and previous tobacco use who was seen in  hospital  to the hospital 2/20/18 with complaints of weakness and fevers ×1 month.  He reported that approximately 1 month prior he was having fevers up to 101 Fahrenheit.  He subsequently seen by his primary care provider who diagnosed him with bilateral pneumonia.  He was started on doxycycline.  He was planning to go to [place] at that time and so he did not have significant improvement within 3 days and so he was subsequently switched to Levaquin.  After 2 doses of Levaquin patient developed a rash with hives and was taken off of this.  He subsequen did not improve and was again placed on azithromycin without any improvement.  Throughout this fever did improve to upper 90s to low 100s but did not completely resolve.  He subsequently then was treated with a course of Augmentin.  He did not have any diarrhea or abdominal pain.  He tolerated the most the medications with the exception of the Levaquin.  Outside of the rash and hives he developed on the left when he denies any rash around his eyes chest or  elsewhere.  He has not had no change in his vision.  He denies any chest pains or significant sputum production.  He does not notice any significant timing of the day that she develops this fever.  He subsequently did have a CT angiogram that showed diffuse nodular changes throughout both lungs with the previously noted calcified lymph nodes.  He subsequently was referred for pulmonary consultation due to the CT results.  Of note his CRP was also significantly elevated at 8.1.  He did have a mild leukocytosis but his PMNs were less than 70%.  Does have a previous history of smoking but quit approximately 14 years ago.  No family history of autoimmune diseases.  He subsequently underwent bronchoscopy with TBBx and BAL.  This is subsequently show organizing pneumonia on his pathology and he does have a positive antibody to Coccidioides.    As noted above he did have a positive cocci antibody and so he was started on itraconazole.  He subsequently developed tinnitus within 2 days and this medication was stopped.  We did subsequent switch him over to fluconazole 400 mg daily and he is tolerating this medication.  He states that his tinnitus is very mild and near its baseline.  He still feels fatigued but thinks this is mostly due to holding his CPAP therapy while he is being treated for fungal infection.  He has dyspnea with extreme exertion or when he is staying up late, but this is mostly resolved now.  He has not had any fevers or chills.  He denies any night sweats.  He did undergo repeat CT scan which shows significant improvement in his opacities.  He does have some opacity still noted mostly in areas where he had more consolidation.    Today he continues to do well and feels all of his B symptoms have resolved.  He does still have some tinnitus but this is back to baseline.  He still has diplopia as well but improved.  He is following with ophthalmology for this.  He was seen by an ophthalmologist who thinks the  Diflucan is giving him weak muscles and he is fairly confident that this will resolve once he completes Diflucan, which it is greatly improved.  No further fevers, fatigue, night sweats, or shortness of breath.    He is getting back on his CPAP but has had trouble with chronic rhinitis and sinus congestion.  He has tried zyrtec and claritin in the past.  He thinks he has also tried flonase in the past.  Without much improvement.    Past Medical History:   Diagnosis Date     BPH with elevated PSA      GERD (gastroesophageal reflux disease)      Hypertension      Lymphadenopathy     seen on MRI     Peripheral neuropathy      Urinary frequency      Past Surgical History:   Procedure Laterality Date     MANDIBLE SURGERY      orthodontic procedure     [place] LAP,PROSTATECTOMY,RADICAL,W/NERVE SPARE,INCL ROBOTIC Bilateral 5/20/2015    Procedure: DAVINCI ROBOTIC ASSISTED SIMPLE PROSTATECTOMY WITH BILATERAL PELVIC LYMPH NODE DISSECTION;  Surgeon: Rocky Magaña MD;  Location: [facility];  Service: Urology     PROSTATE BIOPSY      multiple     Social History     Social History     Marital status: Single     Spouse name: N/A     Number of children: N/A     Years of education: N/A     Occupational History     Not on file.     Social History Main Topics     Smoking status: Former Smoker     Packs/day: 1.00     Years: 35.00     Quit date: 5/1/2004     Smokeless tobacco: Never Used     Alcohol use 5.4 oz/week     9 Glasses of wine per week      Comment: weekly     Drug use: No     Sexual activity: Not on file     Other Topics Concern     Not on file     Social History Narrative     No family history on file.  Allergies   Allergen Reactions     Levaquin [Levofloxacin] Hives, Itching and Rash       Review of Systems - 10 point review of systems negative except what is mentioned in the HPI.        Medications:     Current Outpatient Prescriptions on File Prior to Visit   Medication Sig Dispense Refill     acetaminophen  (TYLENOL) 500 MG tablet Take 500-1,000 mg by mouth every 6 (six) hours as needed for pain.       ASPIRIN (ASPIR-81 ORAL) Take by mouth.       bismuth subsalicylate (BISMUTH SUBSALICYLATE) 262 mg Chew chew tab Chew 1-2 tablets 4 (four) times a day as needed.       DOCOSAHEXANOIC ACID/EPA (FISH OIL ORAL) Take 1,200 mg by mouth.       doxylamine (UNISON) 25 mg tablet Take 12.5 mg by mouth at bedtime.       gabapentin (NEURONTIN) 300 MG capsule Take 300 mg by mouth 3 (three) times a day.        lisinopril (PRINIVIL,ZESTRIL) 5 MG tablet Take 10 mg by mouth daily.        multivitamin therapeutic (THERAGRAN) tablet Take 1 tablet by mouth 2 (two) times a day.        simethicone (MYLICON) 80 MG chewable tablet Chew 80 mg every 6 (six) hours as needed.        traMADol (ULTRAM) 50 mg tablet Take 50 mg by mouth 2 (two) times a day as needed for pain.       UBIDECARENONE (COENZYME Q10) 100 mg Tab tablet Take 200 mg by mouth 2 (two) times a day.       No current facility-administered medications on file prior to visit.            Exam/Data:   Vitals  Vitals:    11/09/18 1029   BP: 138/82   Pulse: 86   Resp: 10   SpO2: 92%       EXAM:  GEN: Alert and oriented x3, NAD  HEENT: Nares patent, posterior oropharynx clear.  RESPIRATORY: CTAB.  No wheeze or rales  CARDIOVASCULAR: RRR, no m/r/g  GASTROINTESTINAL: Round, soft, NT/ND  HEM/ONC: no adenopathy, no ecchymosis  MSK: no clubbing.  Ambulates normally  NEURO: cranial nerves appear grossly intact, muscle strength equal  SKIN: no rash or ulcerations      DATA  Personally reviewed the patient's CT scan with the patient.  There are some mild areas of faint opacities mostly where there were previous consolidations.  Overall the CT scan is markedly improved.    CT CHEST WO CONTRAST  3/29/2018 10:38 AM     INDICATION: Organized pneumonia  TECHNIQUE: Routine chest. Dose reduction techniques were used.  IV CONTRAST: None.  COMPARISON: CTA chest 02/19/2018.     FINDINGS:  LUNGS AND PLEURA:  Marked improvement in the previously extensive bilateral reticular, nodular, and tree-in-bud interstitial and groundglass alveolar infiltrates. Relatively mild residual streaky interstitial and patchy groundglass alveolar opacities of   all pulmonary lobes. Mild bibasilar atelectasis. No change in small chronic calcified calcified granulomata of the right upper lobe near the oblique fissure. Relatively lucent appearance of both lungs suggests underlying emphysema.     MEDIASTINUM: Small chronic calcified mediastinal and right hilar lymph nodes related to chronic granulomatous disease. Normal heart and pericardium.     LIMITED UPPER ABDOMEN: Multiple tiny calcified granulomata in the liver and spleen.     MUSCULOSKELETAL: Degenerative changes of the thoracic spine.     IMPRESSION:   CONCLUSION:  Marked improvement in previously extensive bilateral interstitial and alveolar infiltrates.    Organizing pneumonia on surgical pathology  Cocci titer 1:8 on complement fixation    IRINEO was negative, cytology negative.     Fungal cultures pending.    PFTs 5/2018  FEV1/FVC is 70 and is normal.  FEV1 is 98% predicted and is normal.  FVC is 106% predicted and normal.  There was no improvement in spirometry after a single inhaled dose of bronchodilator.  Normal flow volume loop  TLC is 115% predicted and is normal.  RV is 124% predicted and is increased.  DLCO is 85% predicted and is normal when it   is corrected for hemoglobin.     Impression:  Full Pulmonary Function Test is normal.    Assessment/Plan:   Tao Workman is a 68 y.o. male with abnormal CT and fevers, subsequently found to have organizing pneumonia on pathology, possibly cryptogenic vs fungal infection induced.    1. 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  He was started on itraconazole but had side effects and was switched over to fluconazole.  He is now tolerating the fluconazole  well and a CT of the chest is markedly improved on minimal therapy.  I am not sure the coccidiodes is real though as he has not been in endemic areas.  He does report having a cactus in his home and this was bought locally and it sounds as though the soil was essentially commercial so will likely not from an area of the country that would have Coccidioides present.  His symptoms are improving but he was noted to have some mild weight loss, persistent fevers for approximately 1 month, and extreme fatigue.  His antibody titer was elevated but not to a significant level.  I do feel that his overall symptomatology would warrant therapy so I am okay with ongoing treatment.  As far as his organizing pneumonia he has had marked improvement in his symptoms and he has been very hesitant to use steroids.  I do not think that we need to treat this at this point.  I did discuss with him how with organizing pneumonia could come back if it is not truly due to a Coccidioides infection.    Is follow-up cocci antibody is negative.      He completed 12 weeks of therapy.    2. Shortness of breath possibly related to mild COPD:  He does report a response to bronchodilators with albuterol.  He is not using any routine therapy.      3. SKYLER: He has resumed his CPAP.    4. Chronic Rhinitis:  Will try him on singulair.    Recommend:  -trial of singulair  -Return to clinic in 3 months    I spent more than 15 minutes in consultation reviewing natural course of disease, potential therapies, and future plans.      Yaakov Farley,

## 2021-06-22 NOTE — PROGRESS NOTES
Pulmonary Follow Up Note  1/9/2019    Reason for Follow Up: Abnormal CT, organizing pneumonia, now with Coccidioides antibody positive at 1:8 and concern for coccidioides fungal pneumonia.      Problem List:     Patient Active Problem List   Diagnosis     BPH (benign prostatic hyperplasia)     Urosepsis     Bacteremia     Diarrhea     UTI (lower urinary tract infection)     Hypertension     Peripheral neuropathy     Tachycardia     Sepsis (H)     Fluid collection at surgical site     Fever, unspecified fever cause     Pneumonia     Atypical pneumonia     Acute respiratory failure with hypoxia (H)     Weight loss     Chronic fever     Weakness generalized     Pulmonary coccidioidomycosis (H)           History:   Tao Workman is a 67 y.o. old male with past medical history significant for calcified hilar lymph nodes, hypertension, obstructive sleep apnea on CPAP, and previous tobacco use who was seen in  hospital  to the hospital 2/20/18 with complaints of weakness and fevers ×1 month.  He reported that approximately 1 month prior he was having fevers up to 101 Fahrenheit.  He subsequently seen by his primary care provider who diagnosed him with bilateral pneumonia.  He was started on doxycycline.  He was planning to go to [place] at that time and so he did not have significant improvement within 3 days and so he was subsequently switched to Levaquin.  After 2 doses of Levaquin patient developed a rash with hives and was taken off of this.  He subsequen did not improve and was again placed on azithromycin without any improvement.  Throughout this fever did improve to upper 90s to low 100s but did not completely resolve.  He subsequently then was treated with a course of Augmentin.  He did not have any diarrhea or abdominal pain.  He tolerated the most the medications with the exception of the Levaquin.  Outside of the rash and hives he developed on the left when he denies any rash around his eyes chest or  elsewhere.  He has not had no change in his vision.  He denies any chest pains or significant sputum production.  He does not notice any significant timing of the day that she develops this fever.  He subsequently did have a CT angiogram that showed diffuse nodular changes throughout both lungs with the previously noted calcified lymph nodes.  He subsequently was referred for pulmonary consultation due to the CT results.  Of note his CRP was also significantly elevated at 8.1.  He did have a mild leukocytosis but his PMNs were less than 70%.  Does have a previous history of smoking but quit approximately 14 years ago.  No family history of autoimmune diseases.  He subsequently underwent bronchoscopy with TBBx and BAL.  This is subsequently show organizing pneumonia on his pathology and he does have a positive antibody to Coccidioides.    As noted above he did have a positive cocci antibody and so he was started on itraconazole.  He subsequently developed tinnitus within 2 days and this medication was stopped.  We did subsequent switch him over to fluconazole 400 mg daily and he is tolerating this medication.  He states that his tinnitus is very mild and near its baseline.  He still feels fatigued but thinks this is mostly due to holding his CPAP therapy while he is being treated for fungal infection.  He has dyspnea with extreme exertion or when he is staying up late, but this is mostly resolved now.  He has not had any fevers or chills.  He denies any night sweats.  He did undergo repeat CT scan which shows significant improvement in his opacities.  He does have some opacity still noted mostly in areas where he had more consolidation.    The patient's last visit he did continue to have some worsening tinnitus as well as diplopia but this was improving.  He had seen his ophthalmologist who felt that his diplopia was due to muscle weakness from the Diflucan.  He did end up completing the full 12 weeks.    On return  to clinic today he states that all of his diplopia is resolved.  His tenderness is back to the level that it was prior to the Diflucan.  However approximately 2 weeks ago he began to have worsening shortness of breath, fatigue, and cough.  He subsequently was treated with an antibiotic as an outpatient as well as prednisone.  This did not improve things and he subsequently was seen in the emergency department where repeat CT angiogram was done to evaluate for PE is he is also noted to have some hypoxia.  In the emergency room he was switched to doxycycline and the case was discussed with Dr. Joselo talbot who offered bronchoscopy.  He declined this though in favor of follow-up in the pulmonary clinic.  He states today that he is feeling much better.  His cough is significantly improved as well as his fatigue.    I did repeat his Coccidioides antibody which is now normal again at less than 1:2.  His histoplasma antibody is also normal and his urine antigen is normal.  His CRP did go back up mildly to 3.4 but previously was at 8.  We discussed at length that this is most likely recurrence of organizing pneumonia.  He continues to believe though that this is all due to a Coccidioides infection which he believes he acquired previously when he did live in [place] for 2-1/2 years.  He had seen Dr. Dean Rai who had told him that he had likely granulomatous disease from a previous fungal infection.  I discussed with him though that unless he is put on immune suppressants or developed HIV/AIDS people are unlikely to reactivate prior fungal infections.  His transbronchial biopsy during his previous illness though did show evidence of organizing pneumonia.  We did discuss that cryptogenic organizing pneumonia is a recurring disease and that often waxes and wanes.  He is still quite resistant though to using prednisone for any significant duration of time as he has had previous infections that were difficult to control and  he does not want to develop these again.    Ultimately we did just tied to wait on things and to repeat his CT scan in the near future.  He is traveling to [place] soon for approximately 4 weeks and so I will give him a prescription for Omnicef and prednisone to take with him in case this should recur.  He otherwise feels he is doing okay.    Past Medical History:   Diagnosis Date     BPH with elevated PSA      GERD (gastroesophageal reflux disease)      Hypertension      Lymphadenopathy     seen on MRI     Peripheral neuropathy      Urinary frequency      Past Surgical History:   Procedure Laterality Date     MANDIBLE SURGERY      orthodontic procedure     [place] LAP,PROSTATECTOMY,RADICAL,W/NERVE SPARE,INCL ROBOTIC Bilateral 2015    Procedure: DAVINCI ROBOTIC ASSISTED SIMPLE PROSTATECTOMY WITH BILATERAL PELVIC LYMPH NODE DISSECTION;  Surgeon: Rocky Magaña MD;  [place] [facility] [place]  Service: Urology     PROSTATE BIOPSY      multiple     Social History     Socioeconomic History     Marital status: Single     Spouse name: Not on file     Number of children: Not on file     Years of education: Not on file     Highest education level: Not on file   Social Needs     Financial resource strain: Not on file     Food insecurity - worry: Not on file     Food insecurity - inability: Not on file     Transportation needs - medical: Not on file     Transportation needs - non-medical: Not on file   Occupational History     Not on file   Tobacco Use     Smoking status: Former Smoker     Packs/day: 1.00     Years: 35.00     Pack years: 35.00     Last attempt to quit: 2004     Years since quittin.7     Smokeless tobacco: Never Used   Substance and Sexual Activity     Alcohol use: Yes     Alcohol/week: 5.4 oz     Types: 9 Glasses of wine per week     Comment: weekly     Drug use: No     Sexual activity: Not on file   Other Topics Concern     Not on file   Social History Narrative     Not on file     No family  history on file.  Allergies   Allergen Reactions     Levaquin [Levofloxacin] Hives, Itching and Rash       Review of Systems - 10 point review of systems negative except what is mentioned in the HPI.        Medications:     Current Outpatient Medications on File Prior to Visit   Medication Sig Dispense Refill     acetaminophen (TYLENOL) 500 MG tablet Take 500-1,000 mg by mouth every 6 (six) hours as needed for pain.       ASPIRIN (ASPIR-81 ORAL) Take by mouth.       bismuth subsalicylate (BISMUTH SUBSALICYLATE) 262 mg Chew chew tab Chew 1-2 tablets 4 (four) times a day as needed.       DOCOSAHEXANOIC ACID/EPA (FISH OIL ORAL) Take 1,200 mg by mouth.       doxycycline (VIBRA-TABS) 100 MG tablet 1 tab BID       doxylamine (UNISON) 25 mg tablet Take 12.5 mg by mouth at bedtime.       gabapentin (NEURONTIN) 300 MG capsule Take 300 mg by mouth 3 (three) times a day.        lisinopril (PRINIVIL,ZESTRIL) 5 MG tablet Take 10 mg by mouth daily.        multivitamin therapeutic (THERAGRAN) tablet Take 1 tablet by mouth 2 (two) times a day.        simethicone (MYLICON) 80 MG chewable tablet Chew 80 mg every 6 (six) hours as needed.        traMADol (ULTRAM) 50 mg tablet Take 50 mg by mouth 2 (two) times a day as needed for pain.       UBIDECARENONE (COENZYME Q10) 100 mg Tab tablet Take 200 mg by mouth 2 (two) times a day.       [DISCONTINUED] montelukast (SINGULAIR) 10 mg tablet Take 1 tablet (10 mg total) by mouth at bedtime. 30 tablet 5     No current facility-administered medications on file prior to visit.            Exam/Data:   Vitals  Vitals:    01/09/19 1335   BP: 124/70   Pulse: 100   Resp: 24   SpO2: 95%       EXAM:  GEN: Alert and oriented x3, NAD  HEENT: Nares patent, posterior oropharynx clear.  RESPIRATORY: CTAB.  No wheeze or rales  CARDIOVASCULAR: RRR, no m/r/g  GASTROINTESTINAL: Round, soft, NT/ND  HEM/ONC: no adenopathy, no ecchymosis  MSK: no clubbing.  Ambulates normally  NEURO: cranial nerves appear grossly  intact, muscle strength equal  SKIN: no rash or ulcerations      DATA  Personally reviewed the patient's CT angiogram with him.  There are recurrent mild groundglass opacities and minimal nodular changes in a similar distribution but less severe pattern compared to previous CT angiogram from February 2018.  Agree with radiology interpretation otherwise.    CTA CHEST PE RUN  1/2/2019 3:18 PM     INDICATION: Sob, elevated ddimer sob, elevated ddimer  TECHNIQUE: Helical acquisition through the chest was performed during the arterial phase of contrast enhancement using IV contrast. 2D and 3D reconstructions were performed by the CT technologist. Dose reduction techniques were used.   IV CONTRAST: Iohexol (Omni) 100 mL  COMPARISON: CTA chest 2/19/2018. CT chest 3/29/2018. Chest radiographs 12/18/2018.     FINDINGS:  ANGIOGRAM CHEST: No pulmonary embolus. No aortic dissection or aneurysm.     LUNGS AND PLEURA: Moderate centrilobular emphysema with upper lobe predominance. Small calcified granulomata and scarring of the posterior right upper lobe. Mild progression in groundglass alveolar and reticular interstitial opacities of the peripheral   right upper more than right lower lobes. Mild changes of dependent atelectasis. Scattered areas of streaky scarring/atelectasis mostly affecting the lung bases.     MEDIASTINUM: Small calcified mediastinal and right hilar lymph nodes related to benign granulomatous disease.     LIMITED UPPER ABDOMEN: Multiple tiny calcific granulomata of the liver and spleen.     MUSCULOSKELETAL: Degenerative changes of the thoracic spine.     IMPRESSION:   CONCLUSION:  1.  No pulmonary embolus.  2.  Moderate emphysema with upper lobe predominance.  3.  Mild progression of groundglass alveolar and reticulonodular interstitial opacities of the peripheral right upper more than right lower lobes consistent with an inflammatory process.    CT CHEST WO CONTRAST  3/29/2018 10:38 AM     INDICATION:  Organized pneumonia  TECHNIQUE: Routine chest. Dose reduction techniques were used.  IV CONTRAST: None.  COMPARISON: CTA chest 02/19/2018.     FINDINGS:  LUNGS AND PLEURA: Marked improvement in the previously extensive bilateral reticular, nodular, and tree-in-bud interstitial and groundglass alveolar infiltrates. Relatively mild residual streaky interstitial and patchy groundglass alveolar opacities of   all pulmonary lobes. Mild bibasilar atelectasis. No change in small chronic calcified calcified granulomata of the right upper lobe near the oblique fissure. Relatively lucent appearance of both lungs suggests underlying emphysema.     MEDIASTINUM: Small chronic calcified mediastinal and right hilar lymph nodes related to chronic granulomatous disease. Normal heart and pericardium.     LIMITED UPPER ABDOMEN: Multiple tiny calcified granulomata in the liver and spleen.     MUSCULOSKELETAL: Degenerative changes of the thoracic spine.     IMPRESSION:   CONCLUSION:  Marked improvement in previously extensive bilateral interstitial and alveolar infiltrates.    Organizing pneumonia on surgical pathology  Cocci titer 1:8 on complement fixation --> 1:2    KOH was negative, cytology negative.     Fungal cultures pending.    PFTs 5/2018  FEV1/FVC is 70 and is normal.  FEV1 is 98% predicted and is normal.  FVC is 106% predicted and normal.  There was no improvement in spirometry after a single inhaled dose of bronchodilator.  Normal flow volume loop  TLC is 115% predicted and is normal.  RV is 124% predicted and is increased.  DLCO is 85% predicted and is normal when it   is corrected for hemoglobin.     Impression:  Full Pulmonary Function Test is normal.    Assessment/Plan:   Tao Workman is a 68 y.o. male with abnormal CT and fevers, subsequently found to have organizing pneumonia on pathology, possibly cryptogenic vs fungal infection induced.    1. 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  He was started on itraconazole but had side effects and was switched over to fluconazole.  He has completed the fluconazole and most of his symptoms of tinnitus and diplopia have resolved.  Patient does wonder if this should be retreated.  I did discuss with him that given his normal titer now I would not recommend resuming any antifungal therapy.  I did offer him a infectious disease consultation.  We also discussed that this is most likely the organizing pneumonia at this point given that prednisone would be a better choice.  We ultimately decided that we would hold on any further treatment and just repeat his CT scan in approximately 6 weeks when he returns from [place].  He will take a prescription to have on hand for a burst of prednisone as well as Levaquin in case he starts to feel ill while in [place].    2. Shortness of breath possibly related to mild COPD:  He does report a response to bronchodilators with albuterol.  He is not using any routine therapy.      3. SKYLER: He has resumed his CPAP.    4. Chronic Rhinitis: Continue on Singulair and Flonase as needed    Recommend:  -Repeat CT scan in 6 weeks  -Antibiotics and steroids to have on hand  -Return to clinic in 6 weeks    I spent more than 25 minutes in consultation reviewing natural course of disease, potential therapies, and future plans.      Yaakov Farley, DO

## 2021-06-22 NOTE — TELEPHONE ENCOUNTER
Spoke with Tao.  He will come to be seen by  in clinic on 1/9 at 2:00pm.     Dr. Farley would like him to have some lab work done prior to his office visit.  Patient will have drawn at hospital lab possibly tomorrow.

## 2021-06-22 NOTE — PATIENT INSTRUCTIONS - HE
It is hard to know exactly what is wrong but the fungal studies are normal and I think this is the left over organizing pneumonia.    Let's see how things go.    I will give you a prescription for omnicef twice a day for 10 days and another burst of prednisone for if you get sick in [place].    Otherwise we will repeat your CT in 6 weeks and come back to see me.

## 2021-06-25 NOTE — PATIENT INSTRUCTIONS - HE
I want you to start doing a flutter valve, at least in the morning to get the breathing tubes cleared out, and then again in the evening.    We can always add an inhaler to a nebulizer to this.    Come back to see me in 3 months.

## 2021-06-25 NOTE — PROGRESS NOTES
Pulmonary Follow Up Note  3/19/2019    Reason for Follow Up: Abnormal CT, organizing pneumonia, now with Coccidioides antibody positive at 1:8 and concern for coccidioides fungal pneumonia.      Problem List:     Patient Active Problem List   Diagnosis     BPH (benign prostatic hyperplasia)     Urosepsis     Bacteremia     Diarrhea     UTI (lower urinary tract infection)     Hypertension     Peripheral neuropathy     Tachycardia     Sepsis (H)     Fluid collection at surgical site     Fever, unspecified fever cause     Pneumonia     Atypical pneumonia     Acute respiratory failure with hypoxia (H)     Weight loss     Chronic fever     Weakness generalized     Pulmonary coccidioidomycosis (H)           History:   Tao Workman is a 67 y.o. old male with past medical history significant for calcified hilar lymph nodes, hypertension, obstructive sleep apnea on CPAP, and previous tobacco use who was seen in  hospital  to the hospital 2/20/18 with complaints of weakness and fevers ×1 month.  He reported that approximately 1 month prior he was having fevers up to 101 Fahrenheit.  He subsequently seen by his primary care provider who diagnosed him with bilateral pneumonia.  He was started on doxycycline.  He was planning to go to [place] at that time and so he did not have significant improvement within 3 days and so he was subsequently switched to Levaquin.  After 2 doses of Levaquin patient developed a rash with hives and was taken off of this.  He subsequen did not improve and was again placed on azithromycin without any improvement.  Throughout this fever did improve to upper 90s to low 100s but did not completely resolve.  He subsequently then was treated with a course of Augmentin.  He did not have any diarrhea or abdominal pain.  He tolerated the most the medications with the exception of the Levaquin.  Outside of the rash and hives he developed on the left when he denies any rash around his eyes chest or  elsewhere.  He has not had no change in his vision.  He denies any chest pains or significant sputum production.  He does not notice any significant timing of the day that she develops this fever.  He subsequently did have a CT angiogram that showed diffuse nodular changes throughout both lungs with the previously noted calcified lymph nodes.  He subsequently was referred for pulmonary consultation due to the CT results.  Of note his CRP was also significantly elevated at 8.1.  He did have a mild leukocytosis but his PMNs were less than 70%.  Does have a previous history of smoking but quit approximately 14 years ago.  No family history of autoimmune diseases.  He subsequently underwent bronchoscopy with TBBx and BAL.  This is subsequently show organizing pneumonia on his pathology and he does have a positive antibody to Coccidioides.    As noted above he did have a positive cocci antibody and so he was started on itraconazole.  He subsequently developed tinnitus within 2 days and this medication was stopped.  We did subsequent switch him over to fluconazole 400 mg daily and he is tolerating this medication.  He states that his tinnitus is very mild and near its baseline.  He still feels fatigued but thinks this is mostly due to holding his CPAP therapy while he is being treated for fungal infection.  He has dyspnea with extreme exertion or when he is staying up late, but this is mostly resolved now.  He has not had any fevers or chills.  He denies any night sweats.  He did undergo repeat CT scan which shows significant improvement in his opacities.  He does have some opacity still noted mostly in areas where he had more consolidation.    The patient's last visit he did continue to have some worsening tinnitus as well as diplopia but this was improving.  He had seen his ophthalmologist who felt that his diplopia was due to muscle weakness from the Diflucan.  He did end up completing the full 12 weeks.    On return  to clinic today he states that all of his diplopia is resolved, and he continues to follow with ophthalmology.  His tinnitus is back to the level that it was prior to the Diflucan.  He did not have any problems with his travels to [place].   He did develop a cold upon return to [place].  No fevers, night sweats, chest pains, dyspnea.  He does note that he coughs and will cough up phlegm, mostly in the am.  He continues to use CPAP.He otherwise has no complaints today.    Past Medical History:   Diagnosis Date     BPH with elevated PSA      GERD (gastroesophageal reflux disease)      Hypertension      Lymphadenopathy     seen on MRI     Peripheral neuropathy      Urinary frequency      Past Surgical History:   Procedure Laterality Date     MANDIBLE SURGERY      orthodontic procedure     MS LAP,PROSTATECTOMY,RADICAL,W/NERVE SPARE,INCL ROBOTIC Bilateral 2015    Procedure: DAVINCI ROBOTIC ASSISTED SIMPLE PROSTATECTOMY WITH BILATERAL PELVIC LYMPH NODE DISSECTION;  Surgeon: Rocky Magaña MD;  Location: [facility];  Service: Urology     PROSTATE BIOPSY      multiple     Social History     Socioeconomic History     Marital status: Single     Spouse name: Not on file     Number of children: Not on file     Years of education: Not on file     Highest education level: Not on file   Occupational History     Not on file   Social Needs     Financial resource strain: Not on file     Food insecurity:     Worry: Not on file     Inability: Not on file     Transportation needs:     Medical: Not on file     Non-medical: Not on file   Tobacco Use     Smoking status: Former Smoker     Packs/day: 1.00     Years: 35.00     Pack years: 35.00     Last attempt to quit: 2004     Years since quittin.8     Smokeless tobacco: Never Used   Substance and Sexual Activity     Alcohol use: Yes     Alcohol/week: 5.4 oz     Types: 9 Glasses of wine per week     Comment: weekly     Drug use: No     Sexual activity: Not on file    Lifestyle     Physical activity:     Days per week: Not on file     Minutes per session: Not on file     Stress: Not on file   Relationships     Social connections:     Talks on phone: Not on file     Gets together: Not on file     Attends Congregation service: Not on file     Active member of club or organization: Not on file     Attends meetings of clubs or organizations: Not on file     Relationship status: Not on file     Intimate partner violence:     Fear of current or ex partner: Not on file     Emotionally abused: Not on file     Physically abused: Not on file     Forced sexual activity: Not on file   Other Topics Concern     Not on file   Social History Narrative     Not on file     No family history on file.  Allergies   Allergen Reactions     Levaquin [Levofloxacin] Hives, Itching and Rash       Review of Systems - 10 point review of systems negative except what is mentioned in the HPI.        Medications:     Current Outpatient Medications on File Prior to Visit   Medication Sig Dispense Refill     acetaminophen (TYLENOL) 500 MG tablet Take 500-1,000 mg by mouth every 6 (six) hours as needed for pain.       ASPIRIN (ASPIR-81 ORAL) Take by mouth.       bismuth subsalicylate (BISMUTH SUBSALICYLATE) 262 mg Chew chew tab Chew 1-2 tablets 4 (four) times a day as needed.       DOCOSAHEXANOIC ACID/EPA (FISH OIL ORAL) Take 1,200 mg by mouth.       doxylamine (UNISON) 25 mg tablet Take 12.5 mg by mouth at bedtime.       gabapentin (NEURONTIN) 300 MG capsule Take 300 mg by mouth 2 (two) times a day.              lisinopril (PRINIVIL,ZESTRIL) 5 MG tablet Take 10 mg by mouth daily.        multivitamin therapeutic (THERAGRAN) tablet Take 1 tablet by mouth 2 (two) times a day.        simethicone (MYLICON) 80 MG chewable tablet Chew 80 mg every 6 (six) hours as needed.        traMADol (ULTRAM) 50 mg tablet Take 50 mg by mouth 2 (two) times a day as needed for pain.       UBIDECARENONE (COENZYME Q10) 100 mg Tab tablet  Take 200 mg by mouth 2 (two) times a day.       [DISCONTINUED] predniSONE (DELTASONE) 20 MG tablet Take 40 mg by mouth daily. 10 tablet 0     No current facility-administered medications on file prior to visit.            Exam/Data:   Vitals  Vitals:    03/19/19 1027   BP: 134/82   Pulse: 76   Resp: 20   SpO2: 95%       EXAM:  GEN: Alert and oriented x3, NAD  HEENT: Nares patent, posterior oropharynx clear.  RESPIRATORY: CTAB.  No wheeze or rales  CARDIOVASCULAR: RRR, no m/r/g  GASTROINTESTINAL: Round, soft, NT/ND  HEM/ONC: no adenopathy, no ecchymosis  MSK: no clubbing.  Ambulates normally  NEURO: cranial nerves appear grossly intact, muscle strength equal  SKIN: no rash or ulcerations      DATA  Personally reviewed the patient's CT angiogram with him.  Essentially resolved opacities.  No new opacities or consolidation.  Small calcified granuloma on the right.  No lymphadenopathy.  Mild upper lobe emphysema.  Mild bronchiectasis predominately in the bases.  Agree with radiology interpretation otherwise.    [facility] RADIOLOGY     EXAM: CT CHEST WO CONTRAST  LOCATION: [facility]  DATE/TIME: 3/8/2019 10:51 AM     INDICATION: Diffuse ground glass opacities, organizing pneumonia.  COMPARISON: January 2, 2019.  TECHNIQUE: Helical images were obtained through the chest. Multiplanar reformats were obtained. Dose reduction techniques were used.  IV CONTRAST: None.     FINDINGS:   LUNGS AND PLEURA: Prior small opacities of essentially resolved. Few small calcified granulomas. Mild mild basilar volume loss with bandlike scarring and mild bronchiectasis. Stable moderate emphysema. Negative pleural spaces.     MEDIASTINUM: No adenopathy. Calcified mediastinal and hilar nodes consistent with old granulomatous disease.     LIMITED UPPER ABDOMEN: Calcified hepatic and splenic granulomas. Renal hypodensities may represent cysts. Colonic diverticulosis.     MUSCULOSKELETAL: Degenerative changes spine.     IMPRESSION:    CONCLUSION:      1.  Prior infectious / inflammatory small opacities have essentially resolved. No new findings.     2.  No significant change of basilar scarring and mild bronchiectasis.     3.  Stable moderate emphysema.    Organizing pneumonia on surgical pathology  Cocci titer 1:8 on complement fixation --> 1:2    KOH was negative, cytology negative.     Fungal cultures pending.    PFTs 5/2018  FEV1/FVC is 70 and is normal.  FEV1 is 98% predicted and is normal.  FVC is 106% predicted and normal.  There was no improvement in spirometry after a single inhaled dose of bronchodilator.  Normal flow volume loop  TLC is 115% predicted and is normal.  RV is 124% predicted and is increased.  DLCO is 85% predicted and is normal when it   is corrected for hemoglobin.     Impression:  Full Pulmonary Function Test is normal.    Assessment/Plan:   Tao Workman is a 68 y.o. male with abnormal CT and fevers, subsequently found to have organizing pneumonia on pathology, possibly cryptogenic vs fungal infection induced.    1. 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  He was started on itraconazole but had side effects and was switched over to fluconazole.  He has completed the fluconazole and most of his symptoms of tinnitus and diplopia have resolved.  We also discussed that this is most likely the organizing pneumonia at this point given that prednisone would be a better choice.  CT is stable without evidence of active disease.  Will monitor for symptoms.    2. Shortness of breath possibly related to mild COPD and bronchiectasis:  He does report a response to bronchodilators with albuterol.  He is not using any routine therapy.  He wishes to try a flutter valve two times a day for now, and we can add albuterol nebs later.  He otherwise is active and I don't think would necessarily benefit from maintenance therapy.       3. SKYLER: He has resumed  his CPAP and continues on this.    4. Chronic Rhinitis: Continue on Singulair and Flonase as needed    Recommend:  -Flutter valve two times a day, consider adding albuterol BID  -Continue to monitor for symptoms  -Return to clinic in 3 months    I spent more than 25 minutes in consultation reviewing natural course of disease, potential therapies, and future plans.      Yaakov Farley, DO

## 2021-06-26 ENCOUNTER — HEALTH MAINTENANCE LETTER (OUTPATIENT)
Age: 71
End: 2021-06-26

## 2021-07-03 NOTE — ADDENDUM NOTE
Addendum Note by Janet Dickson RN at 5/25/2018  9:26 AM     Author: Janet Dickson RN Service: -- Author Type: Registered Nurse    Filed: 5/25/2018  9:26 AM Encounter Date: 5/24/2018 Status: Signed    : Janet Dickson RN (Registered Nurse)    Addended by: JANET DICKSON on: 5/25/2018 09:26 AM        Modules accepted: Orders

## 2021-10-01 ENCOUNTER — TRANSFERRED RECORDS (OUTPATIENT)
Dept: HEALTH INFORMATION MANAGEMENT | Facility: CLINIC | Age: 71
End: 2021-10-01

## 2021-10-16 ENCOUNTER — HEALTH MAINTENANCE LETTER (OUTPATIENT)
Age: 71
End: 2021-10-16

## 2022-07-23 ENCOUNTER — HEALTH MAINTENANCE LETTER (OUTPATIENT)
Age: 72
End: 2022-07-23

## 2022-10-01 ENCOUNTER — HEALTH MAINTENANCE LETTER (OUTPATIENT)
Age: 72
End: 2022-10-01

## 2023-08-06 ENCOUNTER — HEALTH MAINTENANCE LETTER (OUTPATIENT)
Age: 73
End: 2023-08-06